# Patient Record
Sex: FEMALE | Race: OTHER | HISPANIC OR LATINO | URBAN - METROPOLITAN AREA
[De-identification: names, ages, dates, MRNs, and addresses within clinical notes are randomized per-mention and may not be internally consistent; named-entity substitution may affect disease eponyms.]

---

## 2024-05-16 ENCOUNTER — EMERGENCY (EMERGENCY)
Facility: HOSPITAL | Age: 24
LOS: 1 days | Discharge: HOME | End: 2024-05-16
Attending: EMERGENCY MEDICINE | Admitting: EMERGENCY MEDICINE
Payer: MEDICAID

## 2024-05-16 VITALS
SYSTOLIC BLOOD PRESSURE: 108 MMHG | RESPIRATION RATE: 17 BRPM | HEART RATE: 94 BPM | TEMPERATURE: 98 F | DIASTOLIC BLOOD PRESSURE: 69 MMHG | OXYGEN SATURATION: 99 %

## 2024-05-16 LAB — HCG UR QL: NEGATIVE — SIGNIFICANT CHANGE UP

## 2024-05-16 PROCEDURE — 99283 EMERGENCY DEPT VISIT LOW MDM: CPT

## 2024-05-16 NOTE — ED PROVIDER NOTE - ATTENDING CONTRIBUTION TO CARE
24-year-old female with no significant past medical history presents to emergency department for pregnancy test.  Patient states her last menstrual period was April 7, he typically has regular cycles.  Has had unprotected intercourse with her  over the past few weeks.  Is endorsing mild nausea and lower abdominal cramping.  Has never been pregnant in the past.  Also experiencing mild clear vaginal discharge.  Is interested in testing for sexually transmitted infections, but only 3 urine not blood.  Denies abdominal pain, vomiting, vaginal bleeding/spotting, dysuria or hematuria or painful sexual intercourse.  No known drug allergies.  Does not currently have an OB/GYN.

## 2024-05-16 NOTE — ED PROVIDER NOTE - PATIENT PORTAL LINK FT
You can access the FollowMyHealth Patient Portal offered by [facility] by registering at the following website: [email or web address]. By joining [employer]’s FollowMyHealth portal, you will also be able to view your health information using other applications (apps) compatible with our system.

## 2024-05-16 NOTE — ED ADULT NURSE REASSESSMENT NOTE - NS ED NURSE REASSESS COMMENT FT1
Patient received to results waiting, pending results at this time. No acute distress noted at this time. Awake and alert, at baseline mental status. Respirations even and unlabored on room air. Safety maintained.

## 2024-05-16 NOTE — ED ADULT NURSE NOTE - OBJECTIVE STATEMENT
Patient presents to the ED for pregnancy test. She is A&Ox4, in no acute distress, ambulatory. Denies pain, vagina bleed. Urines sent to lab.

## 2024-05-16 NOTE — ED PROVIDER NOTE - PHYSICAL EXAMINATION
Gen: well appearing, in no acute distress   Head: normal appearing  HEENT: normal conjunctiva, oral mucosa moist, vision grossly intact   Lung: no respiratory distress, speaking in full sentences, CTA b/l, no wheeze, crackles or rhonchi   CV: regular rate and rhythm, no murmurs  Abd: soft, non distended, non tender, no CVA tenderness   MSK: no visible deformities, ambulating without difficulty   Neuro: No focal deficits, AAOx3  Skin: Warm, no rashes   Psych: normal affect

## 2024-05-16 NOTE — ED ADULT TRIAGE NOTE - CHIEF COMPLAINT QUOTE
pt states " I want to know if I'm pregnant" Denies prior pregnancies, did not take a home preg test, states "I didn't have time" c/o pelvic pain x 2 weeks with nausea. LMP 5/8

## 2024-05-16 NOTE — ED PROVIDER NOTE - OBJECTIVE STATEMENT
24-year-old female with no significant past medical history presents to emergency department for pregnancy test.  Patient states her last menstrual period was April 7, he typically has regular cycles.  Has had unprotected intercourse with her  over the past few weeks.  Is endorsing mild nausea and lower abdominal cramping.  Has never been pregnant in the past.  Also experiencing mild clear vaginal discharge.  Is interested in testing for sexually transmitted infections, but only 3 urine not blood.  Denies abdominal pain, vomiting, vaginal bleeding/spotting, dysuria or hematuria or painful sexual intercourse.  No known drug allergies.  Does not currently have an OB/GYN.  : [id number]

## 2024-05-16 NOTE — ED ADULT NURSE NOTE - NSFALLUNIVINTERV_ED_ALL_ED
Bed/Stretcher in lowest position, wheels locked, appropriate side rails in place/Call bell, personal items and telephone in reach/Instruct patient to call for assistance before getting out of bed/chair/stretcher/Non-slip footwear applied when patient is off stretcher/[place] to call system/Physically safe environment - no spills, clutter or unnecessary equipment/Purposeful proactive rounding/Room/bathroom lighting operational, light cord in reach

## 2024-05-16 NOTE — ED PROVIDER NOTE - NSPTACCESSSVCSAPPTDETAILS_ED_ALL_ED_FT
possible pregnancy, does not have OBGYN, needs to establish care   (please note patient is only German speaking)

## 2024-05-16 NOTE — ED PROVIDER NOTE - CLINICAL SUMMARY MEDICAL DECISION MAKING FREE TEXT BOX
24-year-old female with no significant past medical history presents to emergency department for pregnancy test.  Patient states her last menstrual period was April 7, he typically has regular cycles. Sexually active with 1 partner, low concern for sexually transmitted infections, but interested in testing. Offered blood testing as well for complete STD testing, but patient declined. Only wanting urine tests. Will evaluate for pregnancy and GC/chlamydia. Refer to OBGYN.

## 2024-05-16 NOTE — ED PROVIDER NOTE - NSFOLLOWUPINSTRUCTIONS_ED_ALL_ED_FT
Today you were seen in the ED for possible pregnancy.     A copy of your results are attached in this document below. Your urine  pregnancy test was NEGATIVE.     An urgent referral has been placed for evaluation by an OBGYN to help you establish care. Someone should contact you to set up this appointment.     SEEK IMMEDIATE MEDICAL CARE IF YOU HAVE ANY OF THE FOLLOWING SYMPTOMS: severe chest pain, difficulty breathing, fainting, fevers that persist despite taking medications over the counter, vomiting blood, dark or bloody stools, inability to tolerate eating and drinking food by mouth  _____________________________________________________________________________________________________________________  Hoy la atendieron en urgencias por un posible embarazo.    Tasha copia de tish resultados se adjunta en xavi documento a continuación. Tu prueba de embarazo en orina fue NEGATIVA.    Se ha realizado tasha derivación urgente para que un obstetra y ginecólogo la evalúe para ayudarle a establecer la atención. Alguien debería comunicarse con usted para programar esta ady.    BUSQUE ATENCIÓN MÉDICA INMEDIATA SI TIENE ALGUNO DE LOS SIGUIENTES SÍNTOMAS: dolor intenso en el pecho, dificultad para respirar, desmayos, fiebre que persiste a pesar de naveen medicamentos sin receta, vómitos con yari, heces oscuras o con yari, incapacidad para tolerar comer y beber alimentos por vía oral.

## 2024-05-17 LAB
C TRACH RRNA SPEC QL NAA+PROBE: SIGNIFICANT CHANGE UP
N GONORRHOEA RRNA SPEC QL NAA+PROBE: SIGNIFICANT CHANGE UP
SPECIMEN SOURCE: SIGNIFICANT CHANGE UP

## 2025-05-09 ENCOUNTER — EMERGENCY (EMERGENCY)
Facility: HOSPITAL | Age: 25
LOS: 1 days | End: 2025-05-09
Payer: MEDICAID

## 2025-05-09 VITALS
OXYGEN SATURATION: 100 % | TEMPERATURE: 98 F | SYSTOLIC BLOOD PRESSURE: 109 MMHG | HEART RATE: 93 BPM | DIASTOLIC BLOOD PRESSURE: 71 MMHG | RESPIRATION RATE: 17 BRPM

## 2025-05-09 VITALS
TEMPERATURE: 98 F | OXYGEN SATURATION: 100 % | WEIGHT: 138.01 LBS | HEART RATE: 110 BPM | DIASTOLIC BLOOD PRESSURE: 83 MMHG | SYSTOLIC BLOOD PRESSURE: 120 MMHG | RESPIRATION RATE: 16 BRPM | HEIGHT: 60 IN

## 2025-05-09 DIAGNOSIS — Z90.49 ACQUIRED ABSENCE OF OTHER SPECIFIED PARTS OF DIGESTIVE TRACT: Chronic | ICD-10-CM

## 2025-05-09 LAB
ALBUMIN SERPL ELPH-MCNC: 4.6 G/DL — SIGNIFICANT CHANGE UP (ref 3.3–5)
ALP SERPL-CCNC: 105 U/L — SIGNIFICANT CHANGE UP (ref 40–120)
ALT FLD-CCNC: 40 U/L — HIGH (ref 4–33)
ANION GAP SERPL CALC-SCNC: 15 MMOL/L — HIGH (ref 7–14)
APPEARANCE UR: CLEAR — SIGNIFICANT CHANGE UP
APTT BLD: 32.4 SEC — SIGNIFICANT CHANGE UP (ref 26.1–36.8)
AST SERPL-CCNC: 35 U/L — HIGH (ref 4–32)
BASOPHILS # BLD AUTO: 0.04 K/UL — SIGNIFICANT CHANGE UP (ref 0–0.2)
BASOPHILS NFR BLD AUTO: 0.4 % — SIGNIFICANT CHANGE UP (ref 0–2)
BILIRUB SERPL-MCNC: 0.5 MG/DL — SIGNIFICANT CHANGE UP (ref 0.2–1.2)
BILIRUB UR-MCNC: NEGATIVE — SIGNIFICANT CHANGE UP
BLD GP AB SCN SERPL QL: NEGATIVE — SIGNIFICANT CHANGE UP
BUN SERPL-MCNC: 5 MG/DL — LOW (ref 7–23)
CALCIUM SERPL-MCNC: 9.6 MG/DL — SIGNIFICANT CHANGE UP (ref 8.4–10.5)
CHLORIDE SERPL-SCNC: 103 MMOL/L — SIGNIFICANT CHANGE UP (ref 98–107)
CO2 SERPL-SCNC: 18 MMOL/L — LOW (ref 22–31)
COLOR SPEC: YELLOW — SIGNIFICANT CHANGE UP
CREAT SERPL-MCNC: 0.53 MG/DL — SIGNIFICANT CHANGE UP (ref 0.5–1.3)
DIFF PNL FLD: NEGATIVE — SIGNIFICANT CHANGE UP
EGFR: 131 ML/MIN/1.73M2 — SIGNIFICANT CHANGE UP
EGFR: 131 ML/MIN/1.73M2 — SIGNIFICANT CHANGE UP
EOSINOPHIL # BLD AUTO: 0.04 K/UL — SIGNIFICANT CHANGE UP (ref 0–0.5)
EOSINOPHIL NFR BLD AUTO: 0.4 % — SIGNIFICANT CHANGE UP (ref 0–6)
GLUCOSE SERPL-MCNC: 96 MG/DL — SIGNIFICANT CHANGE UP (ref 70–99)
GLUCOSE UR QL: NEGATIVE MG/DL — SIGNIFICANT CHANGE UP
HCG SERPL-ACNC: 2014 MIU/ML — SIGNIFICANT CHANGE UP
HCT VFR BLD CALC: 40 % — SIGNIFICANT CHANGE UP (ref 34.5–45)
HGB BLD-MCNC: 13.2 G/DL — SIGNIFICANT CHANGE UP (ref 11.5–15.5)
HIV 1+2 AB+HIV1 P24 AG SERPL QL IA: SIGNIFICANT CHANGE UP
IANC: 6.38 K/UL — SIGNIFICANT CHANGE UP (ref 1.8–7.4)
IMM GRANULOCYTES NFR BLD AUTO: 0.2 % — SIGNIFICANT CHANGE UP (ref 0–0.9)
INR BLD: 0.97 RATIO — SIGNIFICANT CHANGE UP (ref 0.85–1.16)
KETONES UR-MCNC: 40 MG/DL
LEUKOCYTE ESTERASE UR-ACNC: NEGATIVE — SIGNIFICANT CHANGE UP
LYMPHOCYTES # BLD AUTO: 3.29 K/UL — SIGNIFICANT CHANGE UP (ref 1–3.3)
LYMPHOCYTES # BLD AUTO: 31.4 % — SIGNIFICANT CHANGE UP (ref 13–44)
MCHC RBC-ENTMCNC: 27 PG — SIGNIFICANT CHANGE UP (ref 27–34)
MCHC RBC-ENTMCNC: 33 G/DL — SIGNIFICANT CHANGE UP (ref 32–36)
MCV RBC AUTO: 81.8 FL — SIGNIFICANT CHANGE UP (ref 80–100)
MONOCYTES # BLD AUTO: 0.71 K/UL — SIGNIFICANT CHANGE UP (ref 0–0.9)
MONOCYTES NFR BLD AUTO: 6.8 % — SIGNIFICANT CHANGE UP (ref 2–14)
NEUTROPHILS # BLD AUTO: 6.38 K/UL — SIGNIFICANT CHANGE UP (ref 1.8–7.4)
NEUTROPHILS NFR BLD AUTO: 60.8 % — SIGNIFICANT CHANGE UP (ref 43–77)
NITRITE UR-MCNC: NEGATIVE — SIGNIFICANT CHANGE UP
NRBC # BLD AUTO: 0 K/UL — SIGNIFICANT CHANGE UP (ref 0–0)
NRBC # FLD: 0 K/UL — SIGNIFICANT CHANGE UP (ref 0–0)
NRBC BLD AUTO-RTO: 0 /100 WBCS — SIGNIFICANT CHANGE UP (ref 0–0)
PH UR: 6.5 — SIGNIFICANT CHANGE UP (ref 5–8)
PLATELET # BLD AUTO: 336 K/UL — SIGNIFICANT CHANGE UP (ref 150–400)
POTASSIUM SERPL-MCNC: 4.3 MMOL/L — SIGNIFICANT CHANGE UP (ref 3.5–5.3)
POTASSIUM SERPL-SCNC: 4.3 MMOL/L — SIGNIFICANT CHANGE UP (ref 3.5–5.3)
PROT SERPL-MCNC: 8.3 G/DL — SIGNIFICANT CHANGE UP (ref 6–8.3)
PROT UR-MCNC: NEGATIVE MG/DL — SIGNIFICANT CHANGE UP
PROTHROM AB SERPL-ACNC: 11.6 SEC — SIGNIFICANT CHANGE UP (ref 9.9–13.4)
RBC # BLD: 4.89 M/UL — SIGNIFICANT CHANGE UP (ref 3.8–5.2)
RBC # FLD: 15.3 % — HIGH (ref 10.3–14.5)
RH IG SCN BLD-IMP: POSITIVE — SIGNIFICANT CHANGE UP
SODIUM SERPL-SCNC: 136 MMOL/L — SIGNIFICANT CHANGE UP (ref 135–145)
SP GR SPEC: 1.01 — SIGNIFICANT CHANGE UP (ref 1–1.03)
UROBILINOGEN FLD QL: 0.2 MG/DL — SIGNIFICANT CHANGE UP (ref 0.2–1)
WBC # BLD: 10.48 K/UL — SIGNIFICANT CHANGE UP (ref 3.8–10.5)
WBC # FLD AUTO: 10.48 K/UL — SIGNIFICANT CHANGE UP (ref 3.8–10.5)

## 2025-05-09 PROCEDURE — 99285 EMERGENCY DEPT VISIT HI MDM: CPT

## 2025-05-09 PROCEDURE — 76817 TRANSVAGINAL US OBSTETRIC: CPT | Mod: 26

## 2025-05-09 PROCEDURE — 93010 ELECTROCARDIOGRAM REPORT: CPT

## 2025-05-09 RX ADMIN — Medication 1000 MILLILITER(S): at 11:05

## 2025-05-09 NOTE — ED PROVIDER NOTE - PATIENT PORTAL LINK FT
You can access the FollowMyHealth Patient Portal offered by [facility] by registering at the following website: [email or web address]. By joining [employer]’s FollowMyHealth portal, you will also be able to view your health information using other applications (apps) compatible with our system.

## 2025-05-09 NOTE — ED PROVIDER NOTE - NSFOLLOWUPINSTRUCTIONS_ED_ALL_ED_FT
Advance activity as tolerated.  Continue all previously prescribed medications as directed unless otherwise instructed.  Practice pelvic rest -- no sexual intercourse, no foreign bodies, no tampons in vagina.  Follow up with your primary care physician and OB/GYN (referral list provided or call [phone] to make an appointment with the OB/GYN clinic)  in 48-72 hours- bring copies of your results.  Return to the Emergency Department for worsening or persistent symptoms, including but not limited to worsening/persistent pain, heavy vaginal bleeding requiring more than or equal to 2 pads in 1 hour, lightheadedness, passing out, chest pain, shortness of breath, and/or ANY NEW OR CONCERNING SYMPTOMS. If you have issues obtaining follow up, please call: [phone] to obtain a doctor or specialist who takes your insurance in your area.  You may call [phone] to make an appointment with the internal medicine clinic.    PELVIC PAIN IN WOMEN - General Information    Pelvic Pain in Women    WHAT YOU NEED TO KNOW:    What do I need to know about pelvic pain? You may have pain on one or both sides of your pelvis. Pelvic pain may occur with certain body positions or activities, such as when you have sex or a bowel movement. It may worsen during your monthly period or after you sit or stand for a long time. Chronic pelvic pain is pain that continues for longer than 6 months.    What causes pelvic pain in women?    Gynecologic conditions, such as pelvic inflammatory disease (PID), endometriosis, or uterine fibroids    Bowel and bladder conditions, such as irritable bowel syndrome, bladder inflammation, or tumors    Muscle and nerve conditions, such as swelling or weakness of your pelvic muscles, or damage to the nerves of your pelvic area (neuropathy)    Psychological issues as a result of physical or sexual abuse, or drug abuse  How is pelvic pain treated?    Pain medicine may be given in pills, injections, or creams to relieve your pain.    Hormones may be given if your pain gets worse with your menstrual cycle.    Antibiotics may be given if your pain is caused by infection.    Surgery may be done if other treatments do not relieve your pain.  How can I manage my pelvic pain?    Keep a pain diary. Write down when your pain happens, how severe it is, and any other symptoms you have with your pain. A diary will help you keep track of pain cycles. It may also help your healthcare provider find out what is causing your pain.    Learn ways to relax. Deep breathing, meditation, and relaxation techniques can help decrease your pain. When you are tense, your pain may increase.    Change the foods you eat if you have irritable bowel syndrome. Ask your healthcare provider about the best foods for you.  Call 911 for any of the following:    You have severe chest pain and sudden trouble breathing.    When should I seek immediate care?    You have heavy or unusual vaginal bleeding, and you feel lightheaded or faint.    When should I contact my healthcare provider?    You have pelvic pain that does not go away after you take pain medicine.    You develop new symptoms or your symptoms are worse than before.    You have questions or concerns about your condition or care.  CARE AGREEMENT:    You have the right to help plan your care. Learn about your health condition and how it may be treated. Discuss treatment options with your healthcare providers to decide what care you want to receive. You always have the right to refuse treatment.    © [employer] 1973, 2023 Advance activity as tolerated.  Continue all previously prescribed medications as directed unless otherwise instructed.  Practice pelvic rest -- no sexual intercourse, no foreign bodies, no tampons in vagina.  Follow up with your primary care physician and OB/GYN (referral list provided or call [phone] to make an appointment with the OB/GYN clinic)  in 48-72 hours; RETURN TO THE EMERGENCY DEPARTMENT FOR REPEAT LAB WORK (A REPEAT HCG) IN 2 DAYS; ON SUNDAY 5/11/25 -- bring copies of your results.  Return to the Emergency Department for worsening or persistent symptoms, including but not limited to worsening/persistent pain, heavy vaginal bleeding requiring more than or equal to 2 pads in 1 hour, lightheadedness, passing out, chest pain, shortness of breath, and/or ANY NEW OR CONCERNING SYMPTOMS. If you have issues obtaining follow up, please call: [phone] to obtain a doctor or specialist who takes your insurance in your area.  You may call [phone] to make an appointment with the internal medicine clinic.    PELVIC PAIN IN WOMEN - General Information    Pelvic Pain in Women    WHAT YOU NEED TO KNOW:    What do I need to know about pelvic pain? You may have pain on one or both sides of your pelvis. Pelvic pain may occur with certain body positions or activities, such as when you have sex or a bowel movement. It may worsen during your monthly period or after you sit or stand for a long time. Chronic pelvic pain is pain that continues for longer than 6 months.    What causes pelvic pain in women?    Gynecologic conditions, such as pelvic inflammatory disease (PID), endometriosis, or uterine fibroids    Bowel and bladder conditions, such as irritable bowel syndrome, bladder inflammation, or tumors    Muscle and nerve conditions, such as swelling or weakness of your pelvic muscles, or damage to the nerves of your pelvic area (neuropathy)    Psychological issues as a result of physical or sexual abuse, or drug abuse  How is pelvic pain treated?    Pain medicine may be given in pills, injections, or creams to relieve your pain.    Hormones may be given if your pain gets worse with your menstrual cycle.    Antibiotics may be given if your pain is caused by infection.    Surgery may be done if other treatments do not relieve your pain.  How can I manage my pelvic pain?    Keep a pain diary. Write down when your pain happens, how severe it is, and any other symptoms you have with your pain. A diary will help you keep track of pain cycles. It may also help your healthcare provider find out what is causing your pain.    Learn ways to relax. Deep breathing, meditation, and relaxation techniques can help decrease your pain. When you are tense, your pain may increase.    Change the foods you eat if you have irritable bowel syndrome. Ask your healthcare provider about the best foods for you.  Call 911 for any of the following:    You have severe chest pain and sudden trouble breathing.    When should I seek immediate care?    You have heavy or unusual vaginal bleeding, and you feel lightheaded or faint.    When should I contact my healthcare provider?    You have pelvic pain that does not go away after you take pain medicine.    You develop new symptoms or your symptoms are worse than before.    You have questions or concerns about your condition or care.  CARE AGREEMENT:    You have the right to help plan your care. Learn about your health condition and how it may be treated. Discuss treatment options with your healthcare providers to decide what care you want to receive. You always have the right to refuse treatment.    © [employer] 1973, 2023

## 2025-05-09 NOTE — ED ADULT NURSE NOTE - NSFALLASSESSNEED_ED_ALL_ED
SURVEY:    You may be receiving a survey from [employer] regarding your visit today. Please complete the survey to enable us to provide the highest quality of care to you and your family. If you cannot score us a very good on any question, please call the office to discuss how we could have made your experience a very good one. Thank you.
no
Patient seen at bedside resting comfortably offers no current complaints. Ambulating and voiding without difficulty.  Passing flatus and tolerating regular diet.  both breast/bottle feeding . Denies HA, CP, SOB, N/V/D, dizziness, palpitations, worsening abdominal pain, worsening vaginal bleeding, or any other concerns.    Vital Signs Last 24 Hrs  T(C): 37.2 (15 Jul 2020 05:43), Max: 37.2 (15 Jul 2020 05:43)  T(F): 98.9 (15 Jul 2020 05:43), Max: 98.9 (15 Jul 2020 05:43)  HR: 111 (15 Jul 2020 05:43) (100 - 122)  BP: 116/76 (15 Jul 2020 05:43) (113/73 - 159/65)  BP(mean): 86 (15 Jul 2020 01:46) (82 - 94)  RR: 16 (15 Jul 2020 05:43) (16 - 20)  SpO2: 99% (15 Jul 2020 05:43) (98% - 100%)    Physical Exam:     Gen: A&Ox 3, NAD  Chest: CTA B/L  Cardiac: S1,S2; RRR  Breast: Soft, nontender, nonengorged  Abdomen: +BS, Soft, nontender,  ND; Fundus firm below umbilicus  Gyn: mod lochia, intact/repaired  Ext: Nontender, DTRS 2+, no worsening edema                          9.8    15.02 )-----------( 268      ( 15 Jul 2020 06:56 )             30.4       A/P:  PPD#1 s/p     -Continue pain management  -Encourage OOB and ambulation  -Check CBC  -Continue current care  -Plan for discharge tomorrow  -d/w dr. andressa oakes
Patient seen at bedside resting comfortably, offers no current complaints.  Ambulating and voiding without difficulty.  Passing flatus and tolerating regular diet.  both breast/bottle feeding.  Denies HA, CP, SOB, N/V/D, dizziness, palpitations, worsening abdominal pain, worsening vaginal bleeding, or any other concerns.      Vital Signs Last 24 Hrs  T(C): 36.7 (16 Jul 2020 05:29), Max: 37.1 (15 Jul 2020 11:05)  T(F): 98 (16 Jul 2020 05:29), Max: 98.8 (15 Jul 2020 11:05)  HR: 91 (16 Jul 2020 05:29) (88 - 101)  BP: 110/75 (16 Jul 2020 05:29) (110/75 - 118/72)  BP(mean): --  RR: 16 (16 Jul 2020 05:29) (16 - 16)  SpO2: 100% (16 Jul 2020 05:29) (99% - 100%)    Physical Exam:   Gen: A&Ox 3, NAD  Chest: CTA B/L  Cardiac: S1,S2; RRR  Breast: Soft, nontender, nonengorged  Abdomen: +BS; Soft, nontender, ND; Fundus firm below umbilicus  Gyn: Min lochia  Ext: Nontender, DTRS 2+, no worsening edema                          9.8    15.02 )-----------( 268      ( 15 Jul 2020 06:56 )             30.4

## 2025-05-09 NOTE — CONSULT NOTE ADULT - ASSESSMENT
26 y/o  LMP 4/5 presenting with positive home pregnancy test, breast tenderness and abdominal cramping. TVUS today with gestational sac with no yolk sac or fetal pole, no suspicious adnexal masses or free fluid. bHCG , remainder of labs within normal limits with mild transaminitis. No vaginal bleeding and no current abdominal pain. Differential diagnosis at this time includes early IUP vs SAB vs ectopic pregnancy. No clinical or radiologic findings concerning for ruptured ectopic pregnancy.    Recs:   - Given patient has pregnancy of unknown location, no elevated concern for ectopic pregnancy, patient counseled on expectant management with serial bHCG and ultrasound as needed  - Patient counseled on importance of following up in setting of pregnancy of unknown location where ectopic cannot be ruled out, patient voices her understanding   - Patient to follow up in ED in 48hrs for repeat bHCG or clinic on   - Please provide patient with contact information: [facility] [phone]  - Strict return precautions discussed, patient to return to ED for heavy bleeding (soaking >1 pad in 1 hr), severe pain, dizziness/lightheadedness, fevers, chills, CP/SOB    AMY Jim  d/w Dr. Villanueva

## 2025-05-09 NOTE — ED PROVIDER NOTE - PHYSICAL EXAMINATION
Breast exam:  No palpable masses; no breast tenderness; no redness; no nipple deviation; no nipple discharge; no breat swelling; no fluctuance (Chaperone: Lexi  Didi)

## 2025-05-09 NOTE — CONSULT NOTE ADULT - SUBJECTIVE AND OBJECTIVE BOX
MARIA EUGENIA LOPEZ  25y  Female [id number]    HPI: 26 y/o  LMP 4/5 presenting with positive home pregnancy test, breast tenderness and abdominal cramping. Patient reports 15 days ago she began having breast tenderness and intermittent abdominal cramping, googled symptoms and found she could be pregnant. She had two positive home pregnancy tests and presented to the ED. She denies current abdominal cramping, vaginal bleeding, fevers, chills, abnormal vaginal discharge. This is a desired pregnancy.      Name of GYN Physician: Has never seen GYN  OBHx: P0  GynHx: Denies fibroids, cysts, endometriosis, STI's, Abnormal pap smears   PMH: Denies  PSH: Open cholecystectomy, open appy   Meds: Denies  Allx: NKDA  Social History:  Denies smoking use, drug use, alcohol use.      Vital Signs Last 24 Hrs  T(C): 36.9 (09 May 2025 13:32), Max: 36.9 (09 May 2025 13:32)  T(F): 98.4 (09 May 2025 13:32), Max: 98.4 (09 May 2025 13:32)  HR: 93 (09 May 2025 13:32) (93 - 110)  BP: 109/71 (09 May 2025 13:32) (109/71 - 120/83)  BP(mean): --  RR: 17 (09 May 2025 13:32) (16 - 17)  SpO2: 100% (09 May 2025 13:32) (100% - 100%)    Parameters below as of 09 May 2025 13:32  Patient On (Oxygen Delivery Method): room air        Physical Exam:   General: sitting comfortably in bed, NAD   HEENT: neck supple, full ROM  CV: well perfused  Lungs: normal respirations   Abd: Soft, non-tender, non-distended.   :  No bleeding on pad.        LABS:                        13.2   10.48 )-----------( 336      ( 09 May 2025 10:55 )             40.0     05-    136  |  103  |  5[L]  ----------------------------<  96  4.3   |  18[L]  |  0.53    Ca    9.6      09 May 2025 10:55    TPro  8.3  /  Alb  4.6  /  TBili  0.5  /  DBili  x   /  AST  35[H]  /  ALT  40[H]  /  AlkPhos  105      I&O's Detail    PT/INR - ( 09 May 2025 10:55 )   PT: 11.6 sec;   INR: 0.97 ratio         PTT - ( 09 May 2025 10:55 )  PTT:32.4 sec  Urinalysis Basic - ( 09 May 2025 10:55 )    Color: x / Appearance: x / SG: x / pH: x  Gluc: 96 mg/dL / Ketone: x  / Bili: x / Urobili: x   Blood: x / Protein: x / Nitrite: x   Leuk Esterase: x / RBC: x / WBC x   Sq Epi: x / Non Sq Epi: x / Bacteria: x        RADIOLOGY & ADDITIONAL STUDIES:    < from: US Transvaginal, OB (25 @ 12:22) >    ACC: [id number] EXAM:  US OB TRANSVAGINAL   ORDERED BY: KAMRON RUSSELL     PROCEDURE DATE:  2025          INTERPRETATION:  CLINICAL INFORMATION: Pelvic pain and pregnancy.    LMP: 2025    Estimated Gestational Age by LMP: 4 weeks 6 days    COMPARISON: None available.    TECHNIQUE: Endovaginal pelvic sonogram only.    FINDINGS:  Uterus: Single intrauterine gestational sac.    Gestational Sac Size (mean): 4.6 mm  Gestational Sac Shape : Normal.  Crown Rump Length: No fetal pole is seen.  Estimated Gestational Age: 5 weeks 0 days by mean gestational sac size.  Yolk Sac: Not seen  Fetal Heart Rate: Not applicable    Right ovary: 3.5 cm x 3.0 cm x 2.7 cm. 2.0 cm corpus luteal cyst.  Left ovary: 2.9 cm x 2.1 cm x 1.9 cm. Within normal limits.    Fluid: None.    IMPRESSION:  Single intrauterine gestational sac without yolk sac or fetal pole.   Recommend follow-up with serial beta-hCG and pelvic sonogram.      --- End of Report ---        IVET BOURGEOIS MD; Attending Radiologist  This document has been electronically signed. May  9 2025 12:33PM    < end of copied text >   MARIA EUGENIA LOPEZ  25y  Female [id number]   ID: [id number]    HPI: 24 y/o  LMP 4/5 presenting with positive home pregnancy test, breast tenderness and abdominal cramping. Patient reports 15 days ago she began having breast tenderness and intermittent abdominal cramping, googled symptoms and found she could be pregnant. She had two positive home pregnancy tests and presented to the ED. She denies current abdominal cramping, vaginal bleeding, fevers, chills, abnormal vaginal discharge. This is a desired pregnancy.      Name of GYN Physician: Has never seen GYN  OBHx: P0  GynHx: Denies fibroids, cysts, endometriosis, STI's, Abnormal pap smears   PMH: Denies  PSH: Open cholecystectomy, open appy   Meds: Denies  Allx: NKDA  Social History:  Denies smoking use, drug use, alcohol use.      Vital Signs Last 24 Hrs  T(C): 36.9 (09 May 2025 13:32), Max: 36.9 (09 May 2025 13:32)  T(F): 98.4 (09 May 2025 13:32), Max: 98.4 (09 May 2025 13:32)  HR: 93 (09 May 2025 13:32) (93 - 110)  BP: 109/71 (09 May 2025 13:32) (109/71 - 120/83)  BP(mean): --  RR: 17 (09 May 2025 13:32) (16 - 17)  SpO2: 100% (09 May 2025 13:32) (100% - 100%)    Parameters below as of 09 May 2025 13:32  Patient On (Oxygen Delivery Method): room air        Physical Exam:   General: sitting comfortably in bed, NAD   HEENT: neck supple, full ROM  CV: well perfused  Lungs: normal respirations   Abd: Soft, non-tender, non-distended.   :  No bleeding on pad.        LABS:                        13.2   10.48 )-----------( 336      ( 09 May 2025 10:55 )             40.0     05-    136  |  103  |  5[L]  ----------------------------<  96  4.3   |  18[L]  |  0.53    Ca    9.6      09 May 2025 10:55    TPro  8.3  /  Alb  4.6  /  TBili  0.5  /  DBili  x   /  AST  35[H]  /  ALT  40[H]  /  AlkPhos  105      I&O's Detail    PT/INR - ( 09 May 2025 10:55 )   PT: 11.6 sec;   INR: 0.97 ratio         PTT - ( 09 May 2025 10:55 )  PTT:32.4 sec  Urinalysis Basic - ( 09 May 2025 10:55 )    Color: x / Appearance: x / SG: x / pH: x  Gluc: 96 mg/dL / Ketone: x  / Bili: x / Urobili: x   Blood: x / Protein: x / Nitrite: x   Leuk Esterase: x / RBC: x / WBC x   Sq Epi: x / Non Sq Epi: x / Bacteria: x        RADIOLOGY & ADDITIONAL STUDIES:    < from: US Transvaginal, OB (25 @ 12:22) >    ACC: [id number] EXAM:  US OB TRANSVAGINAL   ORDERED BY: KAMRON RUSSELL     PROCEDURE DATE:  2025          INTERPRETATION:  CLINICAL INFORMATION: Pelvic pain and pregnancy.    LMP: 2025    Estimated Gestational Age by LMP: 4 weeks 6 days    COMPARISON: None available.    TECHNIQUE: Endovaginal pelvic sonogram only.    FINDINGS:  Uterus: Single intrauterine gestational sac.    Gestational Sac Size (mean): 4.6 mm  Gestational Sac Shape : Normal.  Crown Rump Length: No fetal pole is seen.  Estimated Gestational Age: 5 weeks 0 days by mean gestational sac size.  Yolk Sac: Not seen  Fetal Heart Rate: Not applicable    Right ovary: 3.5 cm x 3.0 cm x 2.7 cm. 2.0 cm corpus luteal cyst.  Left ovary: 2.9 cm x 2.1 cm x 1.9 cm. Within normal limits.    Fluid: None.    IMPRESSION:  Single intrauterine gestational sac without yolk sac or fetal pole.   Recommend follow-up with serial beta-hCG and pelvic sonogram.      --- End of Report ---        IVET BOURGEOIS MD; Attending Radiologist  This document has been electronically signed. May  9 2025 12:33PM    < end of copied text >

## 2025-05-09 NOTE — ED PROVIDER NOTE - PROGRESS NOTE DETAILS
AMY RUSSELL:  Pt continues to deny any active complaints at this time.  OB/GYN recommends repeat HCG in the emergency department in 2 days.  Pt medically stable for discharge.  Strict return precautions given.  Pt to follow up in the ED.

## 2025-05-09 NOTE — ED PROVIDER NOTE - OBJECTIVE STATEMENT
Patient is a 25-year-old female with past medical history of appendectomy, cholecystectomy presents with pelvic pain x 2 weeks, bilateral breast soreness x 1 week and positive pregnancy test yesterday.  Patient reports LMP is 4/5/2025.  Patient speaks [place]; language line [employer]  ID number [id number] used.  Patient reports she developed mild pelvic cramping which she describes feels similar to menstrual cramps over the past 2 weeks.  Patient reports mild bilateral breast soreness for the past 1 week.  Patient reports when she looked up her symptoms online, she discovered that this may be the symptoms in pregnancy for which she took 2 pregnancy tests yesterday which she states were positive.  Presently, patient denies any active pain.  Patient denies any fevers, chills, nausea, vomiting, diarrhea, shortness of breath, palpitations, cough, hemoptysis, vaginal bleeding, vaginal discharge, dysuria, malodorous urine, OCP use, history of VTE, history of malignancy, illicit drug use, alcohol abuse, recent surgeries, calf pain/swelling.

## 2025-05-09 NOTE — ED ADULT TRIAGE NOTE - CHIEF COMPLAINT QUOTE
Patient c/o lower abdominal pain and bilateral breast pain x 15 days. Had positive home pregnancy test yesterday. LMP 4/5. Denies fever, nausea, vomiting, diarrhea, urinary symptoms, vaginal bleeding. Denies phx

## 2025-05-09 NOTE — ED ADULT NURSE NOTE - OBJECTIVE STATEMENT
pt arrives to ED pt c/o having + pregnancy test yesterday x2 pt c/o some mild pelvic pain LMP april 10 md patel in progress

## 2025-05-10 LAB
HCV AB S/CO SERPL IA: 0.13 S/CO — SIGNIFICANT CHANGE UP (ref 0–0.79)
HCV AB SERPL-IMP: SIGNIFICANT CHANGE UP

## 2025-05-11 ENCOUNTER — EMERGENCY (EMERGENCY)
Facility: HOSPITAL | Age: 25
LOS: 1 days | End: 2025-05-11
Attending: EMERGENCY MEDICINE | Admitting: EMERGENCY MEDICINE
Payer: MEDICAID

## 2025-05-11 VITALS
TEMPERATURE: 98 F | HEART RATE: 96 BPM | DIASTOLIC BLOOD PRESSURE: 73 MMHG | HEIGHT: 60 IN | OXYGEN SATURATION: 99 % | RESPIRATION RATE: 17 BRPM | SYSTOLIC BLOOD PRESSURE: 110 MMHG | WEIGHT: 139.99 LBS

## 2025-05-11 VITALS
SYSTOLIC BLOOD PRESSURE: 107 MMHG | TEMPERATURE: 98 F | RESPIRATION RATE: 16 BRPM | DIASTOLIC BLOOD PRESSURE: 75 MMHG | HEART RATE: 81 BPM | OXYGEN SATURATION: 99 %

## 2025-05-11 DIAGNOSIS — Z90.49 ACQUIRED ABSENCE OF OTHER SPECIFIED PARTS OF DIGESTIVE TRACT: Chronic | ICD-10-CM

## 2025-05-11 LAB
ALBUMIN SERPL ELPH-MCNC: 4.3 G/DL — SIGNIFICANT CHANGE UP (ref 3.3–5)
ALP SERPL-CCNC: 103 U/L — SIGNIFICANT CHANGE UP (ref 40–120)
ALT FLD-CCNC: 33 U/L — SIGNIFICANT CHANGE UP (ref 4–33)
ANION GAP SERPL CALC-SCNC: 13 MMOL/L — SIGNIFICANT CHANGE UP (ref 7–14)
AST SERPL-CCNC: 22 U/L — SIGNIFICANT CHANGE UP (ref 4–32)
BASOPHILS # BLD AUTO: 0.04 K/UL — SIGNIFICANT CHANGE UP (ref 0–0.2)
BASOPHILS NFR BLD AUTO: 0.3 % — SIGNIFICANT CHANGE UP (ref 0–2)
BILIRUB SERPL-MCNC: 0.3 MG/DL — SIGNIFICANT CHANGE UP (ref 0.2–1.2)
BUN SERPL-MCNC: 9 MG/DL — SIGNIFICANT CHANGE UP (ref 7–23)
C TRACH RRNA SPEC QL NAA+PROBE: SIGNIFICANT CHANGE UP
CALCIUM SERPL-MCNC: 9.5 MG/DL — SIGNIFICANT CHANGE UP (ref 8.4–10.5)
CHLORIDE SERPL-SCNC: 105 MMOL/L — SIGNIFICANT CHANGE UP (ref 98–107)
CO2 SERPL-SCNC: 18 MMOL/L — LOW (ref 22–31)
CREAT SERPL-MCNC: 0.56 MG/DL — SIGNIFICANT CHANGE UP (ref 0.5–1.3)
EGFR: 129 ML/MIN/1.73M2 — SIGNIFICANT CHANGE UP
EGFR: 129 ML/MIN/1.73M2 — SIGNIFICANT CHANGE UP
EOSINOPHIL # BLD AUTO: 0.15 K/UL — SIGNIFICANT CHANGE UP (ref 0–0.5)
EOSINOPHIL NFR BLD AUTO: 1.2 % — SIGNIFICANT CHANGE UP (ref 0–6)
GLUCOSE SERPL-MCNC: 102 MG/DL — HIGH (ref 70–99)
HCG SERPL-ACNC: 3845 MIU/ML — SIGNIFICANT CHANGE UP
HCT VFR BLD CALC: 38.4 % — SIGNIFICANT CHANGE UP (ref 34.5–45)
HGB BLD-MCNC: 12.6 G/DL — SIGNIFICANT CHANGE UP (ref 11.5–15.5)
IANC: 8.41 K/UL — HIGH (ref 1.8–7.4)
IMM GRANULOCYTES NFR BLD AUTO: 0.3 % — SIGNIFICANT CHANGE UP (ref 0–0.9)
LYMPHOCYTES # BLD AUTO: 26.4 % — SIGNIFICANT CHANGE UP (ref 13–44)
LYMPHOCYTES # BLD AUTO: 3.42 K/UL — HIGH (ref 1–3.3)
MCHC RBC-ENTMCNC: 26.9 PG — LOW (ref 27–34)
MCHC RBC-ENTMCNC: 32.8 G/DL — SIGNIFICANT CHANGE UP (ref 32–36)
MCV RBC AUTO: 81.9 FL — SIGNIFICANT CHANGE UP (ref 80–100)
MONOCYTES # BLD AUTO: 0.91 K/UL — HIGH (ref 0–0.9)
MONOCYTES NFR BLD AUTO: 7 % — SIGNIFICANT CHANGE UP (ref 2–14)
N GONORRHOEA RRNA SPEC QL NAA+PROBE: SIGNIFICANT CHANGE UP
NEUTROPHILS # BLD AUTO: 8.41 K/UL — HIGH (ref 1.8–7.4)
NEUTROPHILS NFR BLD AUTO: 64.8 % — SIGNIFICANT CHANGE UP (ref 43–77)
NRBC # BLD AUTO: 0 K/UL — SIGNIFICANT CHANGE UP (ref 0–0)
NRBC # FLD: 0 K/UL — SIGNIFICANT CHANGE UP (ref 0–0)
NRBC BLD AUTO-RTO: 0 /100 WBCS — SIGNIFICANT CHANGE UP (ref 0–0)
PLATELET # BLD AUTO: 299 K/UL — SIGNIFICANT CHANGE UP (ref 150–400)
POTASSIUM SERPL-MCNC: 4.4 MMOL/L — SIGNIFICANT CHANGE UP (ref 3.5–5.3)
POTASSIUM SERPL-SCNC: 4.4 MMOL/L — SIGNIFICANT CHANGE UP (ref 3.5–5.3)
PROT SERPL-MCNC: 7.8 G/DL — SIGNIFICANT CHANGE UP (ref 6–8.3)
RBC # BLD: 4.69 M/UL — SIGNIFICANT CHANGE UP (ref 3.8–5.2)
RBC # FLD: 15.1 % — HIGH (ref 10.3–14.5)
SODIUM SERPL-SCNC: 136 MMOL/L — SIGNIFICANT CHANGE UP (ref 135–145)
WBC # BLD: 12.97 K/UL — HIGH (ref 3.8–10.5)
WBC # FLD AUTO: 12.97 K/UL — HIGH (ref 3.8–10.5)

## 2025-05-11 PROCEDURE — 99285 EMERGENCY DEPT VISIT HI MDM: CPT

## 2025-05-11 PROCEDURE — 76817 TRANSVAGINAL US OBSTETRIC: CPT | Mod: 26

## 2025-05-11 RX ORDER — ACETAMINOPHEN 500 MG/5ML
650 LIQUID (ML) ORAL ONCE
Refills: 0 | Status: COMPLETED | OUTPATIENT
Start: 2025-05-11 | End: 2025-05-11

## 2025-05-11 RX ORDER — PRENATAL 136/IRON/FOLIC ACID 27 MG-1 MG
1 TABLET ORAL
Qty: 21 | Refills: 0
Start: 2025-05-11 | End: 2025-05-31

## 2025-05-11 RX ADMIN — Medication 650 MILLIGRAM(S): at 08:22

## 2025-05-11 RX ADMIN — Medication 1000 MILLILITER(S): at 08:23

## 2025-05-11 NOTE — ED PROVIDER NOTE - NSFOLLOWUPINSTRUCTIONS_ED_ALL_ED_FT
Rest, drink plenty of fluids.  Advance activity as tolerated.  Continue all previously prescribed medications as directed.  Follow up with your primary care physician in 48-72 hours- bring copies of your results.  Return to the ER for worsening or persistent symptoms, and/or ANY NEW OR CONCERNING SYMPTOMS. If you have issues obtaining follow up, please call: [phone] to obtain a doctor or specialist who takes your insurance in your area.  You may call [phone] to make an appointment with the internal medicine clinic.       Ambulatory Clinic       [facility]       [address] [place] [address]       Oncology building       [phone]

## 2025-05-11 NOTE — ED PROVIDER NOTE - PATIENT PORTAL LINK FT
You can access the FollowMyHealth Patient Portal offered by [facility] by registering at the following website: [email or web address]. By joining [employer]’s FollowMyHealth portal, you will also be able to view your health information using other applications (apps) compatible with our system.

## 2025-05-11 NOTE — ED ADULT NURSE NOTE - OBJECTIVE STATEMENT
Pt received to room 15, A&O x 4, ambulatory, primarily Pashto, denies pmhx, coming to ED with complaints of pregnancy problems, Pt , approximately 5 weeks pregnant, recently seen at ED and had confirmed IUP, told to come back for repeat TVUS. Pt additionally endorsing lower abdominal pain for past day, denies bleeding or discharge. Pt denies HA, dizziness, chest pain, SOB, nausea, vomiting, diarrhea, urinary abnormalities, fever, chills. Safety maintained, comfort provided, awaiting orders at this time.

## 2025-05-11 NOTE — ED PROVIDER NOTE - PROGRESS NOTE DETAILS
AMY Salomon: patient currently stable, does not have any bleeding, discussed case with obgyn whom do see a yolk sac on ultrasound, and recommend for patient to follow up outpatient with obgyn. will recommend f/u

## 2025-05-11 NOTE — ED ADULT TRIAGE NOTE - AVIAN FLU SYMPTOMS
"Osmani Bucio presents to [facility] who presents to the clinic for 3-month follow-up.      History of Present Illness  This is a 61-year-old male who presents to the clinic for 3-month follow-up.    Diabetes mellitus type 2: Did review patient's most recent blood work, patient states that he has not been taking his medication routinely, there is often several days that he will go without taking his medication at all, and states that he has not been following a very good diet.  Patient's A1c increased from 10.5% last year to 15.8%.  Patient states that he has been extremely lethargic, just really fatigued and gets winded really easily with any types of activity.  States he knows he needs to get better control over his diabetes.  States that he is really reluctant to do any new medications, specifically anything with an injection, but knows that he may not have an ultimate choice.  States that he is not really having any other symptoms at this time.  Is supposed to be taking metformin and glipizide.  Have tried several other medications in the past but insurance would not cover them.    Patient also states that he thinks that he may have an upper respiratory infection.  States that he is got a lot of sinus pressure and can gesturing, has been blowing out some phlegm that is colored, has had a scratchy itchy throat, and is afraid of it moving further down into his chest.  Denies any major cough, no fever/chills/body aches.  No other major symptoms.    The following portions of the patient's history were personally reviewed and updated as appropriate: allergies, current medications, past medical history, past surgical history, past family history, and past social history.       Objective   Vital Signs:   /90 (BP Location: Left arm, Patient Position: Sitting, Cuff Size: Adult)   Pulse 79   Temp 97.2 °F (36.2 °C)   Ht 185.4 cm (72.99\")   Wt 98.8 kg (217 lb 12.8 oz)   SpO2 " 97%   BMI 28.74 kg/m²     Body mass index is 28.74 kg/m².    All labs, imaging, test results, and specialty provider notes reviewed with patient.     Physical Exam  Vitals reviewed.   Constitutional:       Appearance: Normal appearance.   Cardiovascular:      Rate and Rhythm: Normal rate and regular rhythm.      Pulses: Normal pulses.      Heart sounds: Normal heart sounds.   Pulmonary:      Effort: Pulmonary effort is normal.      Breath sounds: Normal breath sounds.   Neurological:      General: No focal deficit present.      Mental Status: He is alert and oriented to person, place, and time.              Assessment and Plan:  Diagnoses and all orders for this visit:    1. Uncontrolled type 2 diabetes mellitus with hyperglycemia (Primary)  Assessment & Plan:  Not controlled at all, at this point significantly worsened and discussed with patient about the long-term effects of having uncontrolled blood sugar and other organ damage that can result from that.  Do not have a choice, have to place patient on insulin.  Discussed with him this insulin, discussed with him how to adjust dosage, and will refer him to endocrine for further management as well.  Patient to continue and consistently take his metformin and glipizide as prescribed.  Discussed the importance of this.  Adjusting diet is also significantly important as well.      Orders:  -     Insulin Glargine (LANTUS SOLOSTAR) 100 UNIT/ML injection pen; Inject 10-40 Units under the skin into the appropriate area as directed Daily. Increase by 3 units every 3 days until fasting blood sugar less than 250 or reaches 40 units  Dispense: 45 mL; Refill: 1  -     Ambulatory Referral to Endocrinology    2. Non-compliance    3. Acute non-recurrent pansinusitis  Comments:  Treat with antibiotics.  Orders:  -     azithromycin (Zithromax Z-Placido) 250 MG tablet; Take 2 tablets by mouth on day 1, then 1 tablet daily on days 2-5  Dispense: 6 tablet; Refill: 0    4. DDD  (degenerative disc disease), lumbar  Comments:  Continue on current medication of tramadol and muscle relaxer.  UDS/consent up-to-date Nikolai reviewed.    5. Arthritis          Follow Up:  No follow-ups on file.    Patient was given instructions and counseling regarding his condition or for health maintenance advice. Please see specific information pulled into the AVS if appropriate.        No

## 2025-05-11 NOTE — ED ADULT NURSE NOTE - NSFALLUNIVINTERV_ED_ALL_ED
Bed/Stretcher in lowest position, wheels locked, appropriate side rails in place/Call bell, personal items and telephone in reach/Instruct patient to call for assistance before getting out of bed/chair/stretcher/Non-slip footwear applied when patient is off stretcher/[place] to call system/Physically safe environment - no spills, clutter or unnecessary equipment/Purposeful proactive rounding/Room/bathroom lighting operational, light cord in reach

## 2025-05-11 NOTE — ED ADULT TRIAGE NOTE - NS ED TRIAGE AVPU SCALE
Alert-The patient is alert, awake and responds to voice. The patient is oriented to time, place, and person. The triage nurse is able to obtain subjective information. [FreeTextEntry1] : YORDAN CHRISTIAN  is a 82 year old  F\par \par please note the history is limited from the patient due to cognitive dysfunction.  \par Today she is seen with her aide.\par \par there is a history of coronary artery disease, peripheral arterial disease, hypertension, hyperlipidemia, possible sarcoidosis, breast cancer, cerebrovascular disease, and cognitive dysfunction, thrombocytosis followed by loki Valentinet on a/c\par \par there is report of an occluded aorta with collateralization and prior aortic biiliac endarterectomy\par coronary disease with an anteroapical MI and PCI of the LAD 2003\par \par S/p diagnostic peripheral angiogram 6/2021.  No significant stenosis in bilateral lower extremities and aortoiliofemoral.  This was performed due to non healing LE ankle ulcer above L malleolus, which has since healed s/p wound care. \par \par She denies chest pain, pressure, palpitations, unusual shortness of breath, orthopnea, LE edema, lightheadedness, dizziness, near syncope or syncope. \par \par interim admitted to the hospital for altered mental status.  This is believed to be related to her underlying disease.  \par \par Blood work hemoglobin 14.2 creatinine 1.1 \par  \par March 2022 mild LV dysfunction mild mitral regurgitation \par Carotid doppler shows mild nonobstructive atherosclerosis \par Abd US 3/24/21 shows mild atherosclerosis, no AAA.\par Nuclear stress test 4/6/21 shows med-svr apical/inferior, anterior wall defects that are fixed suggestive of infarct. Akinetic anterior wall noted. There are no sig changes from prior, known ICMP.

## 2025-05-11 NOTE — ED ADULT TRIAGE NOTE - CHIEF COMPLAINT QUOTE
Pt  approx 5 weeks pregnant (confirmed IUP) states was told to come back to ED for repeat TVUS and urine test. Endorsing intermittent lower abdominal pain since dishcarge. Denies vaginal bleeding/discharge. Denies chest pain, SOB, dizziness, nausea/vomiting. No Hx,

## 2025-05-11 NOTE — ED PROVIDER NOTE - ATTENDING APP SHARED VISIT CONTRIBUTION OF CARE
25F p/w lower abd pain and nausea, was advised to RTER after ED visit 2 days ago due to pregnancy of unknown location.  Prior to that pt had been having lower abd pain x 2 weeks, described as cramping, after which she did home pregnancy test, it was positive.  Pt came to ED and had labs and US showing BHCG of 2000 and US showing gestational sac only w/o definitive IUP.  Since then pt having mild SP abd pain and nausea yesterday. No longer nauseous today.  Denies vaginal bleeding.  PMHX denies.  PSHX denies.  No appx, CCY.  No T.  NKDA. VS wnl.  NAD.  Exam benign aside from lower SP mild ttp.  Ambulatory in no distress.  Impression pregnancy of unknown location with associated symptoms of pregnancy.  Check labs, US, urine, rx tylenol.  If no IUP seen again will c/s OB.  Upon d/c recc rx of PNV.    VS:  unremarkable    GEN - NAD;   well appearing;   A+O x3   HEAD - NC/AT     ENT - PEERL, EOMI, mucous membranes    moist , no discharge      NECK: Neck supple, non-tender without lymphadenopathy, no masses, no JVD  PULM - CTA b/l,  symmetric breath sounds  COR -  normal heart sounds    ABD - , ND, suprapubic ttp, soft,  BACK - no CVA tenderness, nontender spine     EXTREMS - no edema, no deformity, warm and well perfused    SKIN - no rash    or bruising      NEUROLOGIC - alert, face symmetric, speech fluent, sensation nl, motor no focal deficit.

## 2025-05-11 NOTE — ED PROVIDER NOTE - CLINICAL SUMMARY MEDICAL DECISION MAKING FREE TEXT BOX
25F p/w lower abd pain and nausea, was advised to RTER after ED visit 2 days ago due to pregnancy of unknown location.  Prior to that pt had been having lower abd pain x 2 weeks, described as cramping,  Will do labs ultrasound and reassess with obgyn.

## 2025-05-11 NOTE — CHART NOTE - NSCHARTNOTEFT_GEN_A_CORE
R2 Chart Note    Called by ED for f/u bHCG for pregnancy of unknown location. Pt with appropriate rise in bHCG 2014 (5/9) -> 3845 (5/11).    TVUS performed, +gestational sac with double decidual sign and nothing in the adnexa. Small hyperechoic focus seen at the peripheral margin of the gestational sac, which may represent a developing yolk sac per radiology.     Images reviewed with Dr. Pepe, [facility], there appears to be a yolk sac on some of the images, which would make this an intrauterine pregnancy.     No need for further [facility] follow up, patient can establish care with a GYN for her initial prenatal visit in 3-5 weeks. Patient can follow up with any GYN, but if she does not have one she can call and make an appointment with our clinic:       Ambulatory Clinic       [facility]       [address] [place] [address]       Oncology building       [phone]    L Panella PGY2  d/w and images reviewed w/ Dr. Pepe

## 2025-05-14 PROBLEM — Z78.9 OTHER SPECIFIED HEALTH STATUS: Chronic | Status: ACTIVE | Noted: 2025-05-12

## 2025-05-19 PROBLEM — Z00.00 ENCOUNTER FOR PREVENTIVE HEALTH EXAMINATION: Status: ACTIVE | Noted: 2025-05-19

## 2025-05-20 ENCOUNTER — APPOINTMENT (OUTPATIENT)
Dept: OBGYN | Facility: HOSPITAL | Age: 25
End: 2025-05-20

## 2025-05-20 VITALS
BODY MASS INDEX: 30.37 KG/M2 | HEART RATE: 89 BPM | HEIGHT: 56 IN | SYSTOLIC BLOOD PRESSURE: 117 MMHG | TEMPERATURE: 98.2 F | WEIGHT: 135 LBS | DIASTOLIC BLOOD PRESSURE: 64 MMHG

## 2025-05-23 ENCOUNTER — EMERGENCY (EMERGENCY)
Facility: HOSPITAL | Age: 25
LOS: 1 days | End: 2025-05-23
Admitting: EMERGENCY MEDICINE
Payer: MEDICAID

## 2025-05-23 VITALS
HEIGHT: 60 IN | HEART RATE: 96 BPM | RESPIRATION RATE: 19 BRPM | SYSTOLIC BLOOD PRESSURE: 114 MMHG | WEIGHT: 134.92 LBS | TEMPERATURE: 99 F | OXYGEN SATURATION: 99 % | DIASTOLIC BLOOD PRESSURE: 73 MMHG

## 2025-05-23 DIAGNOSIS — Z90.49 ACQUIRED ABSENCE OF OTHER SPECIFIED PARTS OF DIGESTIVE TRACT: Chronic | ICD-10-CM

## 2025-05-23 LAB
ALBUMIN SERPL ELPH-MCNC: 4.4 G/DL — SIGNIFICANT CHANGE UP (ref 3.3–5)
ALP SERPL-CCNC: 100 U/L — SIGNIFICANT CHANGE UP (ref 40–120)
ALT FLD-CCNC: 34 U/L — HIGH (ref 4–33)
ANION GAP SERPL CALC-SCNC: 14 MMOL/L — SIGNIFICANT CHANGE UP (ref 7–14)
APPEARANCE UR: CLEAR — SIGNIFICANT CHANGE UP
AST SERPL-CCNC: 30 U/L — SIGNIFICANT CHANGE UP (ref 4–32)
BASOPHILS # BLD AUTO: 0.03 K/UL — SIGNIFICANT CHANGE UP (ref 0–0.2)
BASOPHILS NFR BLD AUTO: 0.3 % — SIGNIFICANT CHANGE UP (ref 0–2)
BILIRUB SERPL-MCNC: <0.2 MG/DL — SIGNIFICANT CHANGE UP (ref 0.2–1.2)
BILIRUB UR-MCNC: NEGATIVE — SIGNIFICANT CHANGE UP
BUN SERPL-MCNC: 7 MG/DL — SIGNIFICANT CHANGE UP (ref 7–23)
CALCIUM SERPL-MCNC: 9.8 MG/DL — SIGNIFICANT CHANGE UP (ref 8.4–10.5)
CHLORIDE SERPL-SCNC: 103 MMOL/L — SIGNIFICANT CHANGE UP (ref 98–107)
CO2 SERPL-SCNC: 21 MMOL/L — LOW (ref 22–31)
COLOR SPEC: YELLOW — SIGNIFICANT CHANGE UP
CREAT SERPL-MCNC: 0.6 MG/DL — SIGNIFICANT CHANGE UP (ref 0.5–1.3)
DIFF PNL FLD: NEGATIVE — SIGNIFICANT CHANGE UP
EGFR: 127 ML/MIN/1.73M2 — SIGNIFICANT CHANGE UP
EGFR: 127 ML/MIN/1.73M2 — SIGNIFICANT CHANGE UP
EOSINOPHIL # BLD AUTO: 0.2 K/UL — SIGNIFICANT CHANGE UP (ref 0–0.5)
EOSINOPHIL NFR BLD AUTO: 1.8 % — SIGNIFICANT CHANGE UP (ref 0–6)
GLUCOSE SERPL-MCNC: 105 MG/DL — HIGH (ref 70–99)
GLUCOSE UR QL: NEGATIVE MG/DL — SIGNIFICANT CHANGE UP
HCG SERPL-ACNC: SIGNIFICANT CHANGE UP MIU/ML
HCT VFR BLD CALC: 37 % — SIGNIFICANT CHANGE UP (ref 34.5–45)
HGB BLD-MCNC: 12.5 G/DL — SIGNIFICANT CHANGE UP (ref 11.5–15.5)
IANC: 6.35 K/UL — SIGNIFICANT CHANGE UP (ref 1.8–7.4)
IMM GRANULOCYTES NFR BLD AUTO: 0.3 % — SIGNIFICANT CHANGE UP (ref 0–0.9)
KETONES UR QL: NEGATIVE MG/DL — SIGNIFICANT CHANGE UP
LEUKOCYTE ESTERASE UR-ACNC: NEGATIVE — SIGNIFICANT CHANGE UP
LIDOCAIN IGE QN: 22 U/L — SIGNIFICANT CHANGE UP (ref 7–60)
LYMPHOCYTES # BLD AUTO: 3.73 K/UL — HIGH (ref 1–3.3)
LYMPHOCYTES # BLD AUTO: 32.7 % — SIGNIFICANT CHANGE UP (ref 13–44)
MCHC RBC-ENTMCNC: 27.4 PG — SIGNIFICANT CHANGE UP (ref 27–34)
MCHC RBC-ENTMCNC: 33.8 G/DL — SIGNIFICANT CHANGE UP (ref 32–36)
MCV RBC AUTO: 81 FL — SIGNIFICANT CHANGE UP (ref 80–100)
MONOCYTES # BLD AUTO: 1.05 K/UL — HIGH (ref 0–0.9)
MONOCYTES NFR BLD AUTO: 9.2 % — SIGNIFICANT CHANGE UP (ref 2–14)
NEUTROPHILS # BLD AUTO: 6.35 K/UL — SIGNIFICANT CHANGE UP (ref 1.8–7.4)
NEUTROPHILS NFR BLD AUTO: 55.7 % — SIGNIFICANT CHANGE UP (ref 43–77)
NITRITE UR-MCNC: NEGATIVE — SIGNIFICANT CHANGE UP
NRBC # BLD AUTO: 0 K/UL — SIGNIFICANT CHANGE UP (ref 0–0)
NRBC # FLD: 0 K/UL — SIGNIFICANT CHANGE UP (ref 0–0)
NRBC BLD AUTO-RTO: 0 /100 WBCS — SIGNIFICANT CHANGE UP (ref 0–0)
PH UR: 7 — SIGNIFICANT CHANGE UP (ref 5–8)
PLATELET # BLD AUTO: 288 K/UL — SIGNIFICANT CHANGE UP (ref 150–400)
POTASSIUM SERPL-MCNC: 3.8 MMOL/L — SIGNIFICANT CHANGE UP (ref 3.5–5.3)
POTASSIUM SERPL-SCNC: 3.8 MMOL/L — SIGNIFICANT CHANGE UP (ref 3.5–5.3)
PROT SERPL-MCNC: 8 G/DL — SIGNIFICANT CHANGE UP (ref 6–8.3)
PROT UR-MCNC: NEGATIVE MG/DL — SIGNIFICANT CHANGE UP
RBC # BLD: 4.57 M/UL — SIGNIFICANT CHANGE UP (ref 3.8–5.2)
RBC # FLD: 14.9 % — HIGH (ref 10.3–14.5)
SODIUM SERPL-SCNC: 138 MMOL/L — SIGNIFICANT CHANGE UP (ref 135–145)
SP GR SPEC: 1.01 — SIGNIFICANT CHANGE UP (ref 1–1.03)
TROPONIN T, HIGH SENSITIVITY RESULT: <6 NG/L — SIGNIFICANT CHANGE UP
UROBILINOGEN FLD QL: 0.2 MG/DL — SIGNIFICANT CHANGE UP (ref 0.2–1)
WBC # BLD: 11.39 K/UL — HIGH (ref 3.8–10.5)
WBC # FLD AUTO: 11.39 K/UL — HIGH (ref 3.8–10.5)

## 2025-05-23 PROCEDURE — 99285 EMERGENCY DEPT VISIT HI MDM: CPT

## 2025-05-23 PROCEDURE — 76817 TRANSVAGINAL US OBSTETRIC: CPT | Mod: 26

## 2025-05-23 NOTE — ED PROVIDER NOTE - PATIENT PORTAL LINK FT
You can access the FollowMyHealth Patient Portal offered by [facility] by registering at the following website: [email or web address]. By joining [employer]’s FollowMyHealth portal, you will also be able to view your health information using other applications (apps) compatible with our system.

## 2025-05-23 NOTE — ED ADULT NURSE NOTE - IN ACCORDANCE WITH [place] STATE LAW, WE OFFER EVERY PATIENT A HEPATITIS C TEST. WOULD YOU LIKE TO BE TESTED TODAY?
Two pt identifiers confirmed. I offered the patient's daughter a nurse visit today to review and discuss how to use an inhaler. She stated, it is very difficult to get her mom out of the house because she has memory issues. She does not want to bring her mom to the office right now. She thinks a \"spacer\" will help with the inhaler. The patient also has a cough. Per  we will send in the spacer and she should use Robitussin DM OTC. If she does not improve to call our office. Pt verbalized understanding of information discussed w/ no further questions at this time.   Ruby PittmanPeter, LPN  Spacer    Paty Terrell, [address] [facility]  [address] [place] [address]  W: [phone]  F: [phone] Opt out

## 2025-05-23 NOTE — ED ADULT TRIAGE NOTE - CHIEF COMPLAINT QUOTE
Pt c/o vaginal bleeding and abd pain. States approx 5 weeks pregnant unsure [place]. Denies med hx or daily meds.

## 2025-05-23 NOTE — ED PROVIDER NOTE - BIRTH SEX
ANTICOAGULATION FOLLOW-UP CLINIC VISIT    Patient Name:  Arpan Cuellar  Date:  1/11/2019  Contact Type:  Face to Face    SUBJECTIVE:     Patient Findings     Positives:   Unexplained INR or factor level change    Comments:   No changes in medication, activity, or diet. Patient reports has taken warfarin as instructed, no missed doses. Patient reports no abnormal bruising or bleeding. Patient reports a lot of increased stress in his life at the moment due to his wife being ill. Discussed adjusting patient dose for today but patient is reluctant to do that. Patient will have a serving of leafy greens today to help lower his INR and will resume his maintenance dose and recheck INR in 6 weeks. Writer advised being seen sooner to ensure his INR comes back to his goal range but patient declined. Patient to call [facility] with any changes or concerns. Patient verbalized understanding of all instructions, denies questions or concerns at this time.              OBJECTIVE    INR Protime   Date Value Ref Range Status   01/11/2019 3.2 (A) 0.86 - 1.14 Final       ASSESSMENT / PLAN  INR assessment SUPRA    Recheck INR In: 6 WEEKS    INR Location Clinic      Anticoagulation Summary  As of 1/11/2019    INR goal:   2.0-3.0   TTR:   80.1 % (3.1 y)   INR used for dosing:   3.2! (1/11/2019)   Warfarin maintenance plan:   5 mg (5 mg x 1) every day   Full warfarin instructions:   5 mg every day   Weekly warfarin total:   35 mg   No change documented:   Valeria Garcia RN   Plan last modified:   Veena Cook RN (10/19/2018)   Next INR check:   2/22/2019   Priority:   INR   Target end date:   Indefinite    Indications    Atrial fibrillation (H) [I48.91]  Long term current use of anticoagulant therapy [Z79.01]  Cerebral artery occlusion with cerebral infarction (H) [I63.50]             Anticoagulation Episode Summary     INR check location:       Preferred lab:       Send INR reminders to:   [facility] ANTICOAG POOL    Comments:   * warfarin  5 mg tabs      Anticoagulation Care Providers     Provider Role Specialty Phone number    ANTONI Gtz MD [facility] [phone]            See the Encounter Report to view Anticoagulation Flowsheet and Dosing Calendar (Go to Encounters tab in chart review, and find the Anticoagulation Therapy Visit)        Valeria Garcia RN                  Female

## 2025-05-23 NOTE — ED PROVIDER NOTE - OBJECTIVE STATEMENT
24 y/o female w/ no pmh reports today c/o lower abdominal pain and vaginal bleeding X 1 day. LMP- 4/5/25. She states that she noticed a few drops of blood today from her vagina- was not enough to saturate any pads. she did not try anything for her sx and nothing makes it worse. Denies; pelvic pain, lbp, vaginal dc, cp, sob, abdominal pain, dizziness, ha, fever, chills, nausea or vomiting.

## 2025-05-23 NOTE — ED PROVIDER NOTE - CPE EDP SKIN NORM
Requested updates within Care Everywhere.  Patient's chart was reviewed for overdue MACHO topics.  Immunizations reconciled.    LINKS information updated.   normal...

## 2025-05-23 NOTE — ED PROVIDER NOTE - NEURO NEGATIVE STATEMENT, MLM
172.72
no loss of consciousness, no gait abnormality, no headache, no sensory deficits, and no weakness.

## 2025-05-23 NOTE — ED PROVIDER NOTE - NSFOLLOWUPINSTRUCTIONS_ED_ALL_ED_FT
Sangrado magdaleno el primer trimestre: qué debe saber  Bleeding During Your First Trimester: What to Know  Magdaleno los primeros meses del embarazo, es común tener un poco de sangrado vaginal. Zoila tipo de sangrado también se denomina manchado. A veces, el sangrado es normal y no causa problemas. En otras ocasiones, sin embargo, el sangrado puede ser tasha señal de algo grave.    Cuándo puede presentarse un sangrado normal magdaleno el embarazo:  El óvulo fecundado se adhiere al útero.  Los vasos sanguíneos cambian debido al embarazo.  Se realizan exámenes pélvicos.  Tiene sexo.  Cuándo puede producirse un sangrado anormal:  Tiene tasha infección.  Tiene tumores en el útero.  Tuvo un aborto espontáneo o está en riesgo de tener sung.  Tiene otras complicaciones en el embarazo.  Informe al médico de inmediato si tiene algún tipo de sangrado vaginal.    Siga las siguientes instrucciones en el hogar:  Observe el sangrado    A person using a pen to write in a notebook.  Intente conocer la causa del sangrado. Hágase las siguientes preguntas:  ¿El sangrado aparece por sí solo?  ¿El sangrado aparece después de hacer ciertas actividades, dhara tener sexo o realizarse un examen pélvico?  Escriba lo que observe de serrano sangrado. Por ejemplo:  ¿El sangrado fluye libremente sin detenerse? ¿O empieza, se detiene y vuelve a empezar?  ¿El sangrado es intenso o leve?  ¿Cuántas compresas utiliza al día? ¿Cuánta yari contienen?  Informe a serrano médico si el sangrado empeora o si expulsa tejido. Puede que quiera edward el tejido.  Actividad    No tenga sexo hasta que el médico lo autorice.  Pida ayuda para realizar las actividades cotidianas.  Consulte qué tareas son seguras para hacer en el hogar. Pregunte cuándo puede volver al trabajo o a la escuela.  Medicamentos    [place] los medicamentos solo dhara se lo indicaron.  No tome aspirina a menos que serrano médico se lo indique. La aspirina puede ocasionar sangrado.  Instrucciones generales    No use tampones.  No se conor duchas vaginales.  Concurra a todas las visitas de seguimiento.  Comuníquese con un médico si:  Tiene un sangrado vaginal magdaleno el embarazo.  Tiene cólicos.  Tiene fiebre o siente escalofríos.  Solicite ayuda de inmediato si:  Tiene espasmos muy intensos en el vientre o en la espalda.  El sangrado empeora.  Expulsa coágulos grandes o tasha gran cantidad de tejido.  Siente que va a desvanecerse.  Se siente débil.  Se desmaya.  Tiene tasha pérdida de líquido por la vagina.  Tiene tasha pérdida de líquido abundante por la vagina.  Esta información no tiene dhara fin reemplazar el consejo del médico. Asegúrese de hacerle al médico cualquier pregunta que tenga.

## 2025-05-23 NOTE — ED PROVIDER NOTE - CLINICAL SUMMARY MEDICAL DECISION MAKING FREE TEXT BOX
26 y/o female w/ no pmh reports today c/o lower abdominal pain and vaginal bleeding X 1 day. LMP- 25. She states that she noticed a few drops of blood today from her vagina- was not enough to saturate any pads. she did not try anything for her sx and nothing makes it worse.       pt stable, in nad and non-toxic appearing  unlikely incomplete  vs ectopic but will order TVUS , labs and apap  obgyn will be paged  pt resting comfortably- will reassess.

## 2025-05-23 NOTE — ED ADULT TRIAGE NOTE - AVIAN FLU SYMPTOMS
Detail Level: Detailed Add 82765 Cpt? (Important Note: In 2017 The Use Of 33466 Is Being Tracked By Cms To Determine Future Global Period Reimbursement For Global Periods): no No

## 2025-05-23 NOTE — ED ADULT NURSE NOTE - OBJECTIVE STATEMENT
Pt  5 week pregnant female received in ER for evaluation of abdominal pain and small amount of abdominal bleeding this am. Pt [place] speaking.  #[id number] used to gather pt information . Pt received alert and oriented x4 able to make nees known.  Pt noted with color good with respirations even and non-labored on room air .  Pt abdomen soft and non-distended. pt last mensurations 4/5/25.   Pt denies pain upon voiding, fever or chills . 20G saline lock placed to right upper extremity, labs drawn and IVF started. No s/s of distress noted.

## 2025-06-03 ENCOUNTER — APPOINTMENT (OUTPATIENT)
Dept: OBGYN | Facility: HOSPITAL | Age: 25
End: 2025-06-03

## 2025-06-03 ENCOUNTER — OUTPATIENT (OUTPATIENT)
Dept: OUTPATIENT SERVICES | Facility: HOSPITAL | Age: 25
LOS: 1 days | End: 2025-06-03

## 2025-06-03 VITALS
HEIGHT: 56 IN | DIASTOLIC BLOOD PRESSURE: 67 MMHG | SYSTOLIC BLOOD PRESSURE: 108 MMHG | TEMPERATURE: 98.5 F | WEIGHT: 133 LBS | BODY MASS INDEX: 29.92 KG/M2 | HEART RATE: 100 BPM

## 2025-06-03 DIAGNOSIS — Z90.49 ACQUIRED ABSENCE OF OTHER SPECIFIED PARTS OF DIGESTIVE TRACT: Chronic | ICD-10-CM

## 2025-06-03 DIAGNOSIS — Z34.90 ENCOUNTER FOR SUPERVISION OF NORMAL PREGNANCY, UNSPECIFIED, UNSPECIFIED TRIMESTER: ICD-10-CM

## 2025-06-03 PROCEDURE — 99203 OFFICE O/P NEW LOW 30 MIN: CPT | Mod: GC

## 2025-06-04 DIAGNOSIS — Z34.01 ENCOUNTER FOR SUPERVISION OF NORMAL FIRST PREGNANCY, FIRST TRIMESTER: ICD-10-CM

## 2025-06-04 DIAGNOSIS — Z34.90 ENCOUNTER FOR SUPERVISION OF NORMAL PREGNANCY, UNSPECIFIED, UNSPECIFIED TRIMESTER: ICD-10-CM

## 2025-06-12 NOTE — ED PROVIDER NOTE - LANGUAGE ASSISTANCE NEEDED
Goal Outcome Evaluation:      Plan of Care Reviewed With: patient    Overall Patient Progress: no changeOverall Patient Progress: no change    Outcome Evaluation: pt a/ox2 disoriented to situation and location. Patient disconnected tele and refused to reconnect. Continue with POC.           Yes-Patient/Caregiver accepts free interpretation services...

## 2025-06-17 ENCOUNTER — RESULT REVIEW (OUTPATIENT)
Age: 25
End: 2025-06-17

## 2025-06-17 ENCOUNTER — APPOINTMENT (OUTPATIENT)
Dept: OBGYN | Facility: HOSPITAL | Age: 25
End: 2025-06-17
Payer: MEDICAID

## 2025-06-17 ENCOUNTER — OUTPATIENT (OUTPATIENT)
Dept: OUTPATIENT SERVICES | Facility: HOSPITAL | Age: 25
LOS: 1 days | End: 2025-06-17

## 2025-06-17 VITALS
TEMPERATURE: 98.1 F | WEIGHT: 130 LBS | HEIGHT: 56 IN | BODY MASS INDEX: 29.25 KG/M2 | HEART RATE: 103 BPM | SYSTOLIC BLOOD PRESSURE: 103 MMHG | DIASTOLIC BLOOD PRESSURE: 72 MMHG

## 2025-06-17 DIAGNOSIS — Z00.00 ENCOUNTER FOR GENERAL ADULT MEDICAL EXAMINATION WITHOUT ABNORMAL FINDINGS: ICD-10-CM

## 2025-06-17 DIAGNOSIS — Z34.01 ENCOUNTER FOR SUPERVISION OF NORMAL FIRST PREGNANCY, FIRST TRIMESTER: ICD-10-CM

## 2025-06-17 DIAGNOSIS — O21.9 VOMITING OF PREGNANCY, UNSPECIFIED: ICD-10-CM

## 2025-06-17 DIAGNOSIS — Z90.49 ACQUIRED ABSENCE OF OTHER SPECIFIED PARTS OF DIGESTIVE TRACT: Chronic | ICD-10-CM

## 2025-06-17 PROBLEM — R21 RASH: Status: ACTIVE | Noted: 2025-06-17

## 2025-06-17 PROBLEM — Z81.1 FAMILY HISTORY OF ALCOHOLISM: Status: ACTIVE | Noted: 2025-06-17

## 2025-06-17 LAB
24R-OH-CALCIDIOL SERPL-MCNC: 23.4 NG/ML — SIGNIFICANT CHANGE UP
ALBUMIN SERPL ELPH-MCNC: 4.6 G/DL — SIGNIFICANT CHANGE UP (ref 3.3–5)
ALP SERPL-CCNC: 112 U/L — SIGNIFICANT CHANGE UP (ref 40–120)
ALT FLD-CCNC: 28 U/L — SIGNIFICANT CHANGE UP (ref 4–33)
AMORPH PHOS CRY # URNS: PRESENT
ANION GAP SERPL CALC-SCNC: 17 MMOL/L — HIGH (ref 7–14)
APPEARANCE UR: CLEAR — SIGNIFICANT CHANGE UP
AST SERPL-CCNC: 22 U/L — SIGNIFICANT CHANGE UP (ref 4–32)
BACTERIA # UR AUTO: NEGATIVE /HPF — SIGNIFICANT CHANGE UP
BILIRUB SERPL-MCNC: 0.2 MG/DL — SIGNIFICANT CHANGE UP (ref 0.2–1.2)
BILIRUB UR-MCNC: NEGATIVE — SIGNIFICANT CHANGE UP
BUN SERPL-MCNC: 7 MG/DL — SIGNIFICANT CHANGE UP (ref 7–23)
CALCIUM SERPL-MCNC: 9.9 MG/DL — SIGNIFICANT CHANGE UP (ref 8.4–10.5)
CAST: 0 /LPF — SIGNIFICANT CHANGE UP (ref 0–4)
CHLORIDE SERPL-SCNC: 102 MMOL/L — SIGNIFICANT CHANGE UP (ref 98–107)
CO2 SERPL-SCNC: 20 MMOL/L — LOW (ref 22–31)
COLOR SPEC: YELLOW — SIGNIFICANT CHANGE UP
CREAT SERPL-MCNC: 0.46 MG/DL — LOW (ref 0.5–1.3)
DIFF PNL FLD: NEGATIVE — SIGNIFICANT CHANGE UP
EGFR: 135 ML/MIN/1.73M2 — SIGNIFICANT CHANGE UP
EGFR: 135 ML/MIN/1.73M2 — SIGNIFICANT CHANGE UP
GLUCOSE SERPL-MCNC: 83 MG/DL — SIGNIFICANT CHANGE UP (ref 70–99)
GLUCOSE UR QL: NEGATIVE MG/DL — SIGNIFICANT CHANGE UP
KETONES UR QL: ABNORMAL MG/DL
LEUKOCYTE ESTERASE UR-ACNC: NEGATIVE — SIGNIFICANT CHANGE UP
NITRITE UR-MCNC: NEGATIVE — SIGNIFICANT CHANGE UP
PH UR: 7 — SIGNIFICANT CHANGE UP (ref 5–8)
POTASSIUM SERPL-MCNC: 3.7 MMOL/L — SIGNIFICANT CHANGE UP (ref 3.5–5.3)
POTASSIUM SERPL-SCNC: 3.7 MMOL/L — SIGNIFICANT CHANGE UP (ref 3.5–5.3)
PROT SERPL-MCNC: 8.3 G/DL — SIGNIFICANT CHANGE UP (ref 6–8.3)
PROT UR-MCNC: SIGNIFICANT CHANGE UP MG/DL
RBC CASTS # UR COMP ASSIST: 6 /HPF — HIGH (ref 0–4)
REVIEW: SIGNIFICANT CHANGE UP
SODIUM SERPL-SCNC: 139 MMOL/L — SIGNIFICANT CHANGE UP (ref 135–145)
SP GR SPEC: 1.02 — SIGNIFICANT CHANGE UP (ref 1–1.03)
SQUAMOUS # UR AUTO: 2 /HPF — SIGNIFICANT CHANGE UP (ref 0–5)
URATE SERPL-MCNC: 3.9 MG/DL — SIGNIFICANT CHANGE UP (ref 2.5–7)
UROBILINOGEN FLD QL: 1 MG/DL — SIGNIFICANT CHANGE UP (ref 0.2–1)
WBC UR QL: 1 /HPF — SIGNIFICANT CHANGE UP (ref 0–5)

## 2025-06-17 PROCEDURE — 99213 OFFICE O/P EST LOW 20 MIN: CPT | Mod: 25

## 2025-06-17 PROCEDURE — 76815 OB US LIMITED FETUS(S): CPT | Mod: 26

## 2025-06-17 RX ORDER — PYRIDOXINE HCL (VITAMIN B6) 25 MG
25 TABLET ORAL
Qty: 90 | Refills: 0 | Status: ACTIVE | COMMUNITY
Start: 2025-06-17 | End: 2025-07-17

## 2025-06-17 RX ORDER — ASPIRIN 81 MG/1
81 TABLET, DELAYED RELEASE ORAL DAILY
Qty: 60 | Refills: 5 | Status: ACTIVE | COMMUNITY
Start: 2025-06-17 | End: 1900-01-01

## 2025-06-17 RX ORDER — DOXYLAMINE SUCCINATE 25 MG
25 TABLET ORAL
Qty: 1 | Refills: 3 | Status: ACTIVE | COMMUNITY
Start: 2025-06-17 | End: 1900-01-01

## 2025-06-18 PROBLEM — R79.89 LOW VITAMIN D LEVEL: Status: ACTIVE | Noted: 2025-06-18

## 2025-06-18 LAB
CULTURE RESULTS: SIGNIFICANT CHANGE UP
HBV SURFACE AG SER-ACNC: SIGNIFICANT CHANGE UP
LEAD BLD-MCNC: 7.7 UG/DL — HIGH (ref 0–3.4)
MEV IGG SER-ACNC: 90.4 AU/ML — SIGNIFICANT CHANGE UP
MEV IGG+IGM SER-IMP: POSITIVE — SIGNIFICANT CHANGE UP
RUBV IGG SER-ACNC: <0.1 INDEX — SIGNIFICANT CHANGE UP
RUBV IGG SER-IMP: NEGATIVE — SIGNIFICANT CHANGE UP
SPECIMEN SOURCE: SIGNIFICANT CHANGE UP
T PALLIDUM AB TITR SER: NEGATIVE — SIGNIFICANT CHANGE UP
VZV IGG FLD QL IA: 2.28 S/CO — SIGNIFICANT CHANGE UP
VZV IGG FLD QL IA: POSITIVE — SIGNIFICANT CHANGE UP

## 2025-06-18 RX ORDER — MELATONIN 3 MG
25 MCG TABLET ORAL
Qty: 30 | Refills: 6 | Status: ACTIVE | COMMUNITY
Start: 2025-06-18 | End: 1900-01-01

## 2025-06-19 LAB — CYTOLOGY SPEC DOC CYTO: SIGNIFICANT CHANGE UP

## 2025-06-20 ENCOUNTER — NON-APPOINTMENT (OUTPATIENT)
Age: 25
End: 2025-06-20

## 2025-06-20 LAB
GAMMA INTERFERON BACKGROUND BLD IA-ACNC: 0.03 IU/ML — SIGNIFICANT CHANGE UP
M TB IFN-G BLD-IMP: NEGATIVE — SIGNIFICANT CHANGE UP
M TB IFN-G CD4+ BCKGRND COR BLD-ACNC: 0 IU/ML — SIGNIFICANT CHANGE UP
M TB IFN-G CD4+CD8+ BCKGRND COR BLD-ACNC: 0.01 IU/ML — SIGNIFICANT CHANGE UP
QUANT TB PLUS MITOGEN MINUS NIL: >10 IU/ML — SIGNIFICANT CHANGE UP

## 2025-07-03 ENCOUNTER — ASOB RESULT (OUTPATIENT)
Age: 25
End: 2025-07-03

## 2025-07-03 ENCOUNTER — APPOINTMENT (OUTPATIENT)
Dept: MATERNAL FETAL MEDICINE | Facility: HOSPITAL | Age: 25
End: 2025-07-03
Payer: MEDICAID

## 2025-07-03 PROCEDURE — 76801 OB US < 14 WKS SINGLE FETUS: CPT | Mod: 26

## 2025-07-03 PROCEDURE — 76813 OB US NUCHAL MEAS 1 GEST: CPT | Mod: 26

## 2025-07-16 ENCOUNTER — OUTPATIENT (OUTPATIENT)
Dept: OUTPATIENT SERVICES | Facility: HOSPITAL | Age: 25
LOS: 1 days | End: 2025-07-16

## 2025-07-16 ENCOUNTER — APPOINTMENT (OUTPATIENT)
Dept: CARE COORDINATION | Facility: HOME HEALTH | Age: 25
End: 2025-07-16

## 2025-07-16 ENCOUNTER — RESULT REVIEW (OUTPATIENT)
Age: 25
End: 2025-07-16

## 2025-07-16 ENCOUNTER — APPOINTMENT (OUTPATIENT)
Dept: OBGYN | Facility: HOSPITAL | Age: 25
End: 2025-07-16
Payer: MEDICAID

## 2025-07-16 VITALS
WEIGHT: 126 LBS | BODY MASS INDEX: 28.34 KG/M2 | HEIGHT: 56 IN | DIASTOLIC BLOOD PRESSURE: 70 MMHG | HEART RATE: 95 BPM | SYSTOLIC BLOOD PRESSURE: 111 MMHG | TEMPERATURE: 98.1 F

## 2025-07-16 DIAGNOSIS — Z90.49 ACQUIRED ABSENCE OF OTHER SPECIFIED PARTS OF DIGESTIVE TRACT: Chronic | ICD-10-CM

## 2025-07-16 DIAGNOSIS — O21.9 VOMITING OF PREGNANCY, UNSPECIFIED: ICD-10-CM

## 2025-07-16 DIAGNOSIS — Z34.92 ENCOUNTER FOR SUPERVISION OF NORMAL PREGNANCY, UNSPECIFIED, SECOND TRIMESTER: ICD-10-CM

## 2025-07-16 DIAGNOSIS — R78.71 ABNORMAL LEAD LEVEL IN BLOOD: ICD-10-CM

## 2025-07-16 PROBLEM — Z34.01 ENCOUNTER FOR PRENATAL CARE OF FIRST PREGNANCY, ANTEPARTUM, FIRST TRIMESTER: Status: RESOLVED | Noted: 2025-06-17 | Resolved: 2025-07-16

## 2025-07-16 PROCEDURE — 99213 OFFICE O/P EST LOW 20 MIN: CPT

## 2025-07-16 PROCEDURE — ZZZZZ: CPT

## 2025-07-16 RX ORDER — ONDANSETRON 4 MG/1
4 TABLET, ORALLY DISINTEGRATING ORAL
Qty: 30 | Refills: 0 | Status: ACTIVE | COMMUNITY
Start: 2025-07-16 | End: 1900-01-01

## 2025-07-18 LAB — LEAD BLD-MCNC: 4.5 UG/DL — HIGH (ref 0–3.4)

## 2025-07-21 ENCOUNTER — NON-APPOINTMENT (OUTPATIENT)
Age: 25
End: 2025-07-21

## 2025-08-01 ENCOUNTER — NON-APPOINTMENT (OUTPATIENT)
Age: 25
End: 2025-08-01

## 2025-08-13 ENCOUNTER — APPOINTMENT (OUTPATIENT)
Dept: OBGYN | Facility: HOSPITAL | Age: 25
End: 2025-08-13
Payer: MEDICAID

## 2025-08-13 ENCOUNTER — RESULT REVIEW (OUTPATIENT)
Age: 25
End: 2025-08-13

## 2025-08-13 ENCOUNTER — OUTPATIENT (OUTPATIENT)
Dept: OUTPATIENT SERVICES | Facility: HOSPITAL | Age: 25
LOS: 1 days | End: 2025-08-13

## 2025-08-13 VITALS
HEIGHT: 56 IN | WEIGHT: 132 LBS | DIASTOLIC BLOOD PRESSURE: 61 MMHG | HEART RATE: 98 BPM | BODY MASS INDEX: 29.69 KG/M2 | SYSTOLIC BLOOD PRESSURE: 110 MMHG | TEMPERATURE: 98.1 F

## 2025-08-13 DIAGNOSIS — Z90.49 ACQUIRED ABSENCE OF OTHER SPECIFIED PARTS OF DIGESTIVE TRACT: Chronic | ICD-10-CM

## 2025-08-13 DIAGNOSIS — Z34.92 ENCOUNTER FOR SUPERVISION OF NORMAL PREGNANCY, UNSPECIFIED, SECOND TRIMESTER: ICD-10-CM

## 2025-08-13 PROCEDURE — 99213 OFFICE O/P EST LOW 20 MIN: CPT | Mod: TH,25

## 2025-08-13 RX ORDER — FAMOTIDINE 20 MG/1
20 TABLET, FILM COATED ORAL DAILY
Qty: 1 | Refills: 1 | Status: ACTIVE | COMMUNITY
Start: 2025-08-13 | End: 1900-01-01

## 2025-08-14 LAB
AF-AFP MOM: 1.49 — SIGNIFICANT CHANGE UP
AFP CONCENTRATION: 73.93 NG/ML — SIGNIFICANT CHANGE UP
AFP INTERPRETATION: SIGNIFICANT CHANGE UP
AFP MOM CUT-OFF: 2.5 — SIGNIFICANT CHANGE UP
AFP PERCENTILE: 89.4 — SIGNIFICANT CHANGE UP
AFP SCREENING RESULT: SIGNIFICANT CHANGE UP
AFTER SCREENING RISK OPEN SPINA BIFIDA: SIGNIFICANT CHANGE UP
BEFORE SCREENING RISK OPEN SPINA BIFIDA: SIGNIFICANT CHANGE UP
EXTREME ANALYTE ALERT: NO — SIGNIFICANT CHANGE UP
GEN TEST INFORMATION: SIGNIFICANT CHANGE UP
LEAD BLD-MCNC: 2.8 UG/DL — SIGNIFICANT CHANGE UP (ref 0–3.4)
MS GESTATIONAL AGE: SIGNIFICANT CHANGE UP
ORDER ENTRY INFORMATION: SIGNIFICANT CHANGE UP

## 2025-08-15 DIAGNOSIS — Z34.92 ENCOUNTER FOR SUPERVISION OF NORMAL PREGNANCY, UNSPECIFIED, SECOND TRIMESTER: ICD-10-CM

## 2025-08-29 ENCOUNTER — APPOINTMENT (OUTPATIENT)
Dept: MATERNAL FETAL MEDICINE | Facility: HOSPITAL | Age: 25
End: 2025-08-29
Payer: MEDICAID

## 2025-08-29 ENCOUNTER — ASOB RESULT (OUTPATIENT)
Age: 25
End: 2025-08-29

## 2025-08-29 PROCEDURE — 76811 OB US DETAILED SNGL FETUS: CPT | Mod: 26

## 2025-09-10 ENCOUNTER — APPOINTMENT (OUTPATIENT)
Dept: OBGYN | Facility: HOSPITAL | Age: 25
End: 2025-09-10
Payer: MEDICAID

## 2025-09-10 ENCOUNTER — RESULT REVIEW (OUTPATIENT)
Age: 25
End: 2025-09-10

## 2025-09-10 VITALS
HEIGHT: 56 IN | DIASTOLIC BLOOD PRESSURE: 65 MMHG | WEIGHT: 140 LBS | TEMPERATURE: 98.2 F | SYSTOLIC BLOOD PRESSURE: 121 MMHG | HEART RATE: 107 BPM | BODY MASS INDEX: 31.49 KG/M2

## 2025-09-10 DIAGNOSIS — R78.71 ABNORMAL LEAD LVL IN BLOOD: ICD-10-CM

## 2025-09-10 DIAGNOSIS — R79.89 OTHER SPECIFIED ABNORMAL FINDINGS OF BLOOD CHEMISTRY: ICD-10-CM

## 2025-09-10 DIAGNOSIS — Z34.92 ENCOUNTER FOR SUPERVISION OF NORMAL PREGNANCY, UNSPECIFIED, SECOND TRIMESTER: ICD-10-CM

## 2025-09-10 PROCEDURE — 99213 OFFICE O/P EST LOW 20 MIN: CPT | Mod: TH,25
